# Patient Record
Sex: FEMALE | Race: WHITE | URBAN - NONMETROPOLITAN AREA
[De-identification: names, ages, dates, MRNs, and addresses within clinical notes are randomized per-mention and may not be internally consistent; named-entity substitution may affect disease eponyms.]

---

## 2020-05-21 ENCOUNTER — NEW PATIENT (OUTPATIENT)
Dept: URBAN - NONMETROPOLITAN AREA CLINIC 7 | Facility: CLINIC | Age: 80
End: 2020-05-21
Payer: COMMERCIAL

## 2020-05-21 DIAGNOSIS — H25.13 AGE-RELATED NUCLEAR CATARACT, BILATERAL: Primary | ICD-10-CM

## 2020-05-21 DIAGNOSIS — H43.811 VITREOUS DEGENERATION, RIGHT EYE: ICD-10-CM

## 2020-05-21 PROCEDURE — 92250 FUNDUS PHOTOGRAPHY W/I&R: CPT | Performed by: OPTOMETRIST

## 2020-05-21 PROCEDURE — 99204 OFFICE O/P NEW MOD 45 MIN: CPT | Performed by: OPTOMETRIST

## 2020-05-21 ASSESSMENT — INTRAOCULAR PRESSURE
OS: 18
OD: 18

## 2020-05-21 ASSESSMENT — VISUAL ACUITY
OS: 20/30
OD: 20/40

## 2020-12-07 ENCOUNTER — Encounter (OUTPATIENT)
Dept: URBAN - NONMETROPOLITAN AREA CLINIC 7 | Facility: CLINIC | Age: 80
End: 2020-12-07
Payer: COMMERCIAL

## 2020-12-07 PROCEDURE — 92083 EXTENDED VISUAL FIELD XM: CPT | Performed by: OPTOMETRIST

## 2020-12-07 PROCEDURE — 92133 CPTRZD OPH DX IMG PST SGM ON: CPT | Performed by: OPTOMETRIST

## 2020-12-08 ENCOUNTER — FOLLOW UP ESTABLISHED (OUTPATIENT)
Dept: URBAN - NONMETROPOLITAN AREA CLINIC 7 | Facility: CLINIC | Age: 80
End: 2020-12-08
Payer: COMMERCIAL

## 2020-12-08 DIAGNOSIS — H35.3131 NONEXUDATIVE AGE-RELATED MACULAR DEGENERATION, BILATERAL, EARLY DRY STAGE: ICD-10-CM

## 2020-12-08 PROCEDURE — 99213 OFFICE O/P EST LOW 20 MIN: CPT | Performed by: OPTOMETRIST

## 2020-12-08 ASSESSMENT — INTRAOCULAR PRESSURE
OD: 17
OS: 18

## 2021-04-27 ENCOUNTER — OFFICE VISIT (OUTPATIENT)
Dept: URBAN - NONMETROPOLITAN AREA CLINIC 7 | Facility: CLINIC | Age: 81
End: 2021-04-27
Payer: COMMERCIAL

## 2021-04-27 PROCEDURE — 92134 CPTRZ OPH DX IMG PST SGM RTA: CPT | Performed by: OPTOMETRIST

## 2021-04-27 PROCEDURE — 99213 OFFICE O/P EST LOW 20 MIN: CPT | Performed by: OPTOMETRIST

## 2021-04-27 ASSESSMENT — INTRAOCULAR PRESSURE
OD: 16
OS: 16

## 2021-04-27 NOTE — IMPRESSION/PLAN
Impression: Keratoconjunctivitis sicca, bilateral Plan: Stressed importance of using artificial tears QID.

## 2021-04-27 NOTE — IMPRESSION/PLAN
Impression: Open angle with borderline findings, low risk, bilateral Plan: The C/D ratios and IOP's appear similar.   I will have the patient return for an IOP check and gonio in 4 months at patient request.

## 2021-04-27 NOTE — IMPRESSION/PLAN
Impression: Nonexudative age-related macular degeneration, bilateral, early dry stage: H35.3131. Plan: The AMD appears relatively stable today. I encouraged use of AREDS-based vitamins. I will monitor for future change. Patient education provided.

## 2021-08-23 ENCOUNTER — OFFICE VISIT (OUTPATIENT)
Dept: URBAN - NONMETROPOLITAN AREA CLINIC 7 | Facility: CLINIC | Age: 81
End: 2021-08-23
Payer: COMMERCIAL

## 2021-08-23 DIAGNOSIS — H40.013 OPEN ANGLE WITH BORDERLINE FINDINGS, LOW RISK, BILATERAL: Primary | ICD-10-CM

## 2021-08-23 PROCEDURE — 99213 OFFICE O/P EST LOW 20 MIN: CPT | Performed by: OPTOMETRIST

## 2021-08-23 ASSESSMENT — INTRAOCULAR PRESSURE
OS: 16
OD: 15

## 2021-08-23 NOTE — IMPRESSION/PLAN
Impression: Open angle with borderline findings, low risk, bilateral Plan: IOP's are stable. Monitor. Pt ed.

## 2021-08-23 NOTE — IMPRESSION/PLAN
Impression: Nonexudative age-related macular degeneration, bilateral, early dry stage: H35.3131. Plan: Monitor.

## 2021-11-19 ENCOUNTER — OFFICE VISIT (OUTPATIENT)
Dept: URBAN - NONMETROPOLITAN AREA CLINIC 7 | Facility: CLINIC | Age: 81
End: 2021-11-19
Payer: COMMERCIAL

## 2021-11-19 DIAGNOSIS — H16.223 KERATOCONJUNCTIVITIS SICCA, NOT SPECIFIED AS SJOGREN'S, BILATERAL: ICD-10-CM

## 2021-11-19 DIAGNOSIS — H52.4 PRESBYOPIA: Primary | ICD-10-CM

## 2021-11-19 PROCEDURE — 92014 COMPRE OPH EXAM EST PT 1/>: CPT | Performed by: OPTOMETRIST

## 2021-11-19 ASSESSMENT — INTRAOCULAR PRESSURE
OD: 16
OS: 16

## 2021-11-19 ASSESSMENT — VISUAL ACUITY
OS: 20/40
OD: 20/40

## 2021-11-19 NOTE — IMPRESSION/PLAN
Impression: Keratoconjunctivitis sicca, bilateral Plan: Worse today. Not using AT's. Reinforced use of artificial tears QID.